# Patient Record
Sex: FEMALE | Race: WHITE
[De-identification: names, ages, dates, MRNs, and addresses within clinical notes are randomized per-mention and may not be internally consistent; named-entity substitution may affect disease eponyms.]

---

## 2019-09-13 ENCOUNTER — HOSPITAL ENCOUNTER (EMERGENCY)
Dept: HOSPITAL 47 - EC | Age: 29
Discharge: HOME | End: 2019-09-13
Payer: COMMERCIAL

## 2019-09-13 VITALS — DIASTOLIC BLOOD PRESSURE: 77 MMHG | SYSTOLIC BLOOD PRESSURE: 116 MMHG | HEART RATE: 71 BPM | TEMPERATURE: 97.7 F

## 2019-09-13 VITALS — RESPIRATION RATE: 16 BRPM
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
## 2019-09-13 DIAGNOSIS — S80.12XA: ICD-10-CM

## 2019-09-13 DIAGNOSIS — S89.92XA: ICD-10-CM

## 2019-09-13 DIAGNOSIS — S90.02XA: Primary | ICD-10-CM

## 2019-09-13 PROCEDURE — 99283 EMERGENCY DEPT VISIT LOW MDM: CPT

## 2019-09-13 NOTE — XR
EXAMINATION TYPE: XR tibia fibula LT

 

DATE OF EXAM: 9/13/2019

 

CLINICAL HISTORY: Softball injury 5 days ago with pain

 

TECHNIQUE:  Two views of the left leg are obtained.

 

COMPARISON: None.

 

FINDINGS:  There is no acute fracture or dislocation seen in the left tibia or fibula.  The left knee
 and ankle joints appear within normal limits.  The overlying soft tissue appears unremarkable.

 

IMPRESSION:  There is no acute fracture or dislocation seen in the left tibia or fibula.
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
